# Patient Record
(demographics unavailable — no encounter records)

---

## 2024-11-18 NOTE — PHYSICAL EXAM
[de-identified] : Patient is awake and alert. Well appearing in no acute distress Patient is interactive and appropriate RUE 5/5 Deltoid/Biceps/Triceps/WE/WF//IO LUE 5/5 Deltoid/Biceps/Triceps/WE/WF//IO RLE 5/5 HF/KE/KF/DF/PF/EHL LLE 5/5 HF/KE/KF/DF/PF/EHL Sensation intact to light touch Midline lower thoracic/lumbar spine tender to palpation  No paraspinal tenderness to palpation bilaterally  Negative straight leg raise bilaterally  Negative Mackay's bilaterally Negative clonus bilaterally Narrow-based gait within normal limits reflexes 2+ [de-identified] : XRay Lumbar Spine 10/27/2023 ([facility]): There are no acute fractures. Cortical margins are intact. Vertebral body heights are well-maintained. Intervertebral disc heights are well-maintained. There is no osteophyte formation. Facet joints are unremarkable. There is no evidence of spondylolysis Vertebral alignment is normal. Sacroiliac joints demonstrate no degenerative changes.. Lumbar lordotic curve is normal. There is mild thoracolumbar dextroscoliosis IMPRESSION:  Mild thoracolumbar dextroscoliosis.   XRay Thoracic Spine 10/19/2023 ([facility]): Osseous structures demonstrate normal mineralization with no evidence of a destructive lesion. There are no significant degenerative changes. Vertebral body heights are well-maintained with no compression deformities. Thoracic curvature demonstrates mild S-shaped scoliosis.  IMPRESSION:  Mild S-shaped scoliosis.

## 2024-11-18 NOTE — HISTORY OF PRESENT ILLNESS
[de-identified] : Ms. Lazara Diggs is a very pleasant [place] speaking ( utilized) 29 y/o female with a history of scoliosis (first diagnosed when she was 13 years old, no bracing required), presents today for a neurosurgical consultation for chronic back pain that has been worsening for the last 4 years since the birth of her second child. She reports intermittent mid/lower midline back pain that will awaken her from sleep and is worse with sitting or standing for longer periods of time. She also reports intermittent right leg shooting pain down the posterior/lateral aspect of her thigh which does not go to the foot, this was worse about 4 months ago prompting a visit to the ED because she had difficulty ambulating. Now, she states the lower back pain is most bothersome and her leg pain is intermittent in nature. She takes Advil/Tylenol with minimal relief in her symptoms. She last attended physical therapy about 1 year ago without significant benefit. She denies any pain management treatment in the past. She denies bowel/bladder incontinence or saddle anesthesia.

## 2024-11-18 NOTE — ASSESSMENT
Emergency Department Nursing Plan of Care       The Nursing Plan of Care is developed from the Nursing assessment and Emergency Department Attending provider initial evaluation. The plan of care may be reviewed in the ED Provider note. The Plan of Care was developed with the following considerations:   Patient / Family readiness to learn indicated by:verbalized understanding  Persons(s) to be included in education: patient and family  Barriers to Learning/Limitations:No    Signed     Lindsay Argue    3/14/2017   5:17 PM    See nursing assessment    Patient is alert and oriented x 4 and in no acute distress at this time. Respirations are at a regular rate, depth, and pattern. Patient updated on plan of care and has no questions or concerns at this time. [FreeTextEntry1] : Ms. Lazara Diggs is a very pleasant Occ[place] speaking ( utilized) 31 y/o female with a history of scoliosis (first diagnosed when she was 13 years old, no bracing required), presents today for a neurosurgical consultation for chronic back pain that has been worsening for the last 4 years since the birth of her second child. She reports mid/lower back pain that is worsened with prolonged sitting, standing or laying down with intermittent shooting pain of the posterior right thigh and calf (never to the foot). She most recently went to physical therapy about 1 year ago and has taken Advil/Tylenol the last few months with minimal relief in her symptoms. On exam she is neurologically intact with 5/5 strength in bilateral upper & lower extremities, without evidence of hyperreflexia, negative straight leg bilaterally but notable for midline lower thoracic/upper lumbar tenderness to palpation.  XR Lumbar/Thoracic spine obtained in October 2023 revealed very mild scoliotic curves with thoracic dextroscoliotic curve with apex at T12-L1 and levoscoliotic thoracic curve at apex T5-6.. We discussed that her scoliotic deformity appears to be mild and we would not recommend surgical intervention for correction of the scoliosis.  We recommended first-line treatment for musculoskeletal axial back pain, as well as a potential evaluation by pain management as back pain is multifactorial in nature. We will obtain an XRay Scoliosis Series to evaluate the degree of scoliotic deformity. She will follow up in 4-6 weeks for a symptom evaluation after physical therapy, if her sx are refractory to PT, we will consider a referral to pain management.   Plan: - XRay Scoliosis series - Referral to physical therapy, [facility]  - Follow up in 4-6 weeks after physical therapy for evaluation of symptoms, would consider pain management or further imaging at that time should symptoms persist

## 2024-12-16 NOTE — HISTORY OF PRESENT ILLNESS
[de-identified] : Ms. Lazara Diggs is a very pleasant Indonesian speaking ( utilized) 29 y/o female with a history of scoliosis (first diagnosed when she was 13 years old, no bracing required), presents today for a follow up visit for chronic back pain that has been worsening for the last 4 years since the birth of her second child. She was last seen on November 18th, 2024, since then she has attended physical therapy for 2 weeks (4 sessions total), and obtained an XRay scoliosis series at [facility]. Overall, she reports her mid/lower back pain has remained stable, and she denies any radicular symptoms of her lower extremities. Her most bothersome symptom is left sided mid back pain, that is worse with standing, sitting or lying down for long periods of time. Her pain will improve with movement. She denies any bowel/bladder incontinence or saddle anesthesia.  She takes Tylenol PRN with some relief in symptoms. She has never seen pain management.

## 2024-12-16 NOTE — PHYSICAL EXAM
[de-identified] : Patient is awake and alert.  Well appearing in no acute distress Patient is interactive and appropriate RLE 5/5 HF/KE/KF/DF/PF/EHL LLE 5/5 HF/KE/KF/DF/PF/EHL Sensation intact to light touch Negative clonus bilaterally Narrow-based gait within normal limits reflexes 2+ [de-identified] : XRay Scoliosis 2 or 3 views 11/22/2024 ([facility]): IMPRESSION:  1. 16 degrees upper thoracic levoscoliosis. 2. 22 degrees thoracolumbar dextroscoliosis. 3. 8 mm pelvic tilt, right measuring greater than the left.  C7 xiomara line is notably 1 cm left of CSVL and SVA is approximately 1.4 cm

## 2024-12-16 NOTE — ASSESSMENT
[FreeTextEntry1] : Ms. Lazara Diggs is a very pleasant Macedonian speaking ( utilized) 31 y/o female with a history of scoliosis (first diagnosed when she was 13 years old, no bracing required), presents today for a follow up visit for chronic back pain that has been worsening for the last 4 years since the birth of her second child. She was last seen on November 18th, 2024, and has since attended physical therapy for 2 weeks (4 sessions total) without significant benefit of her mid/lower left side back pain as of yet. Overall, her left sided mid/low back pain has remained stable since her last visit without any new symptoms. She had an XRay scoliosis series obtained at [facility] in November 2024 which did not reveal a significant degree of scoliotic deformity. First line treatment for back pain w/o radicular symptoms is physical therapy for stretching/strengthening, as well as potential pain management evaluation for injections or a PO medication regimen for pain. We discussed that her scoliotic deformity does not require a surgical correction, and again encouraged conservative management. We will obtain an XRay Scoliosis Series in 1 year to evaluate for any potential progression of scoliotic deformity.    Plan: - Continue with physical therapy - Referral to pain management, Dr. Metzger  - XRay Scoliosis AP & Lateral in 1 year  - Follow up in 1 year